# Patient Record
Sex: FEMALE | Race: WHITE | NOT HISPANIC OR LATINO | URBAN - METROPOLITAN AREA
[De-identification: names, ages, dates, MRNs, and addresses within clinical notes are randomized per-mention and may not be internally consistent; named-entity substitution may affect disease eponyms.]

---

## 2017-01-09 NOTE — PATIENT DISCUSSION
EXPLAINED THAT THE SX MAY HAVE DISRUPTED THE VITREOUS OR OTHER PART OF THE BACK OF THE EYE BUT REASSURED HER THAT THESE SYMPTOMS ARE NOT DUE TO A PROBLEM IN THE FRONT OF THE EYE.

## 2017-01-09 NOTE — PATIENT DISCUSSION
DISCOMFORT LIKELY RELATED TO SOME OCULAR SURFACE IRRITATION. REASSURED PT THAT USUALLY GETS BETTER OVER TIME. DISC OPT OF BCTL FOR INCREASED COMFORT. IF NO HELP W/ BCTL CONSIDER OINTMENT Q1 HOUR FOR ADDED LUBRICATION. PT DECLINES BCTL FOR NOW. RECOMMEND AGGRESSIVE LUBRICATION.

## 2017-01-09 NOTE — PATIENT DISCUSSION
NO OCULAR SIGNS OR SOURCES CAUSING FLASHING LIGHTS. PT HAS SYMPTOMS IN PURE DARKNESS AND WHEN HER EYES ARE CLOSED OR OPEN. RECOMMEND RETINAL EVAL. EXPLAINED THAT IF IT WAS RELATED TO THE IOL SHE WOULD NOT SEE THE FLASHING WHEN HER EYES ARE CLOSED.

## 2017-01-09 NOTE — PATIENT DISCUSSION
IF RETINAL WORK UP WNL CONSIDER NEURO CECILY LUNDBERG/ DR. Gwendalyn Hatchet.  PT REPORTS LAST CECILY LUNDBERG/ DR. Gwendalyn Hatchet WAS WNL BUT THAT WAS PRIOR TO CAT SX.

## 2017-01-09 NOTE — PATIENT DISCUSSION
Continue: Lotemax (loteprednol etabonate): gel: 0.5% 1 drop four times a day as directed into left eye 12-

## 2017-02-27 NOTE — PATIENT DISCUSSION
Dry Eye Syndrome (BEBETO) Counseling: Dry Eye Syndrome, also known as Keratoconjunctivitis Sicca, is a common condition that occurs when your tears are not able to provide adequate lubrication for your eyes. Symptoms can be exacerbated by environmental factors such as smoke, wind, or prolonged eye use. Treatment options include, but are not limited to, artificial tears, punctal plugs, Restasis, oral omega-3 supplements, and lubricating ointments. I have explained to the patient that succesful management is dependent on patient compliance with treatment as prescribed and/or the treatment regimen.

## 2017-02-27 NOTE — PATIENT DISCUSSION
RODRICKS ADVISED PT THAT SHE SHOULD CONT TO FOLLOW WITH DR. NUÑEZ DUE TO HER HAVING  PRIOR VISITS AND CURRENT TREATMENT THAT IS WORKING.

## 2017-02-27 NOTE — PATIENT DISCUSSION
PATIENT REPORTS LOSS OF DEPTH PERCEPTION TO HER LEFT SIDE, (EXAMPLE DROPPING THE MILK WHEN SHE REACHES FOR IT). KDS ADV PT THAT HER EYES WILL NOT EFFECT HER DEPTH PERCEPTION AND THAT SHE SHOULD FOLLOW WITH HER INTERNIST.

## 2017-10-27 NOTE — PATIENT DISCUSSION
According to patient history,she had RES in left eye that has resolved and was treated by DR. Nina Moreno.

## 2017-10-27 NOTE — PATIENT DISCUSSION
Patient has seen both Dr. Jeremy Lee and Dr. Yara Zimmerman for issues following her cataract surgery - extreme dry eye per patients comments

## 2018-05-23 NOTE — PATIENT DISCUSSION
MILD RECURRENT CORNEAL EROSION, OU: PRESCRIBED GEL DROPS AT BEDTIME. PT COMPLAINED OF BURNING WHEN USING XIIDRA. TOLD PT TO NOT RESTART XIIDRA AT THIS TIME. TRY GEL DROPS FIRST.

## 2018-05-23 NOTE — PATIENT DISCUSSION
POSTERIOR VITREOUS DETACHMENT, OU: NO RETINAL HOLES OR TEARS ON SLIT LAMP AND INDIRECT OPHTHALMOSCOPY. RD PRECAUTIONS DISCUSSED. CONTINUE FOLLOW UP WITH DR. Florian Rosa.

## 2018-05-23 NOTE — PATIENT DISCUSSION
MODERATE DRY EYES: PRESCRIBED PRESERVATIVE FREE ARTIFICIAL TEARS 4-6X A DAY, OU AND THE DAILY INTAKE OF OMEGA-3 DHA/EPA FATTY ACIDS TO HELP RELIEVE SYMPTOMS. ADD NIGHTLY LUBRICATING OINTMENT OR GEL. WILL CONSIDER RESTASIS OR PUNCTAL PLUGS AND/OR LIPIFLOW TREATMENT NEXT VISIT IF NOT RESPONSIVE OR IF SYMPTOMS PERSIST. RETURN FOR FOLLOW-UP AS SCHEDULED OR SOONER IF SYMPTOMS WORSEN.

## 2019-05-06 ENCOUNTER — IMPORTED ENCOUNTER (OUTPATIENT)
Dept: URBAN - METROPOLITAN AREA CLINIC 31 | Facility: CLINIC | Age: 49
End: 2019-05-06

## 2019-05-06 PROCEDURE — 92004 COMPRE OPH EXAM NEW PT 1/>: CPT

## 2019-05-06 NOTE — PATIENT DISCUSSION
1.  Refractive error - Change glasses. 2.  Return for an appointment in 1 year for comprehensive exam. with Dr. Connie Olivier.

## 2019-11-08 NOTE — PATIENT DISCUSSION
DRY EYE SYNDROME, OU: GOOD RESULTS WITH USE OF RESTASIS AND ARTIFICIAL TEARS BID. CONTINUE AS PRESCRIBED BY DR. Rochelle Bhatti. RETURN FOR FOLLOW-UP AS SCHEDULED OR SOONER IF SYMPTOMS WORSEN.

## 2019-11-08 NOTE — PATIENT DISCUSSION
CHOROIDAL NEVUS, OS: APPEARS STABLE AND BENIGN. PATIENT HAS HISTORY OF MELANOMA OF SKIN (2019). REFER TO RETINA FOR BASELINE EXAM.  PRESCRIBED UV PROTECTION TO MINIMIZE UV EXPOSURE.

## 2019-11-08 NOTE — PATIENT DISCUSSION
REFRACTIVE ERROR: NEW SPEC RX PROVIDED. RX HAS CHANGED FROM HER LAST EXAM. DISCUSSED LIKELIHOOD THAT DRY EYE COULD CONTRIBUTE TO RX SHIFT, BUT NO OTHER OCULAR FINDINGS SUGGEST PATHOLOGICAL REASON FOR RX CHANGE. ALSO RECOMMEND LOWER SEG HEIGHT PER PATIENT REQUEST.

## 2019-12-11 NOTE — PATIENT DISCUSSION
CHOROIDAL NEVUS, OS: STABLE. PRESCRIBED UV PROTECTION TO MINIMIZE UV EXPOSURE. RETURN AS SCHEDULED FOR EVALUATION AND PHOTO DOCUMENTATION WITH DR FALL.

## 2019-12-11 NOTE — PATIENT DISCUSSION
RETURN TO DR FALL FOR YAG EVAL OS, AND ADVANCED MANAGEMENT OF DRY EYE SYNDROME AND ALLERGIC CONJUNCTIVITIS.

## 2019-12-11 NOTE — PATIENT DISCUSSION
RETINA IS ATTACHED OU: PVD OU; MYELINATED RNFL STABLE OD; NO HOLES OR TEARS SEEN ON DILATED EXAM TODAY.  RETINAL DETACHMENT SIGNS AND SYMPTOMS REVIEWED

## 2020-02-21 NOTE — PATIENT DISCUSSION
K SICCA OU: NO IMPROVEMENT WITH PRESERVATIVE FREE ARTIFICIAL TEARS 4-6X A DAY, OU  ADD NIGHTLY LUBRICATING OINTMENT OR GEL. CONTINUE RESTASIS BID OU. CONSIDER PUNCTAL PLUGS AND/OR LIPIFLOW TREATMENT NEXT VISIT IF NOT RESPONSIVE OR IF SYMPTOMS PERSIST. RETURN FOR FOLLOW-UP AS SCHEDULED OR SOONER IF SYMPTOMS WORSEN.

## 2020-05-26 NOTE — PATIENT DISCUSSION
POST OP YAG OS FOR PCO. PATIENT DOING WELL WITHOUT ANY SIGNS OF RETINAL HOLES OR TEARS. NEW SPEC RX GIVEN.

## 2020-08-07 ENCOUNTER — IMPORTED ENCOUNTER (OUTPATIENT)
Dept: URBAN - METROPOLITAN AREA CLINIC 31 | Facility: CLINIC | Age: 50
End: 2020-08-07

## 2020-08-07 PROCEDURE — 92014 COMPRE OPH EXAM EST PT 1/>: CPT

## 2020-08-07 NOTE — PATIENT DISCUSSION
1.  Refractive error - Glasses change optional. 2.  Return for an appointment in 1 year for comprehensive exam. with Dr. Noemy Koehler

## 2020-10-16 NOTE — PATIENT DISCUSSION
[address] RESTASIS BID OU, PT FEELS NOT IMPROVING WITH CURRENT TX. STABLE MRX, DISCUSSED CYL REFRACTION.  REFER TO [facility]

## 2020-10-16 NOTE — PATIENT DISCUSSION
Continue: Restasis (cyclosporine): dropperette: 0.05% 1 drop twice a day as directed into both eyes 09-

## 2021-03-01 ENCOUNTER — OFFICE VISIT (OUTPATIENT)
Age: 51
End: 2021-03-01

## 2021-04-01 ENCOUNTER — OFFICE VISIT (OUTPATIENT)
Age: 51
End: 2021-04-01

## 2021-07-19 ENCOUNTER — IMPORTED ENCOUNTER (OUTPATIENT)
Dept: URBAN - METROPOLITAN AREA CLINIC 31 | Facility: CLINIC | Age: 51
End: 2021-07-19

## 2021-07-19 PROCEDURE — 92015 DETERMINE REFRACTIVE STATE: CPT

## 2021-07-19 PROCEDURE — 92014 COMPRE OPH EXAM EST PT 1/>: CPT

## 2021-08-12 ENCOUNTER — IMPORTED ENCOUNTER (OUTPATIENT)
Dept: URBAN - METROPOLITAN AREA CLINIC 31 | Facility: CLINIC | Age: 51
End: 2021-08-12

## 2021-08-12 PROCEDURE — 92014 COMPRE OPH EXAM EST PT 1/>: CPT

## 2021-08-12 NOTE — PATIENT DISCUSSION
1.  Refractive error - Glasses change optional. 2.  Return for an appointment in 1 year for comprehensive exam. with Dr. Skyler Adkins.

## 2021-09-15 NOTE — PATIENT DISCUSSION
Continue: Restasis (cyclosporine): dropperette: 0.05% 1 a small amount twice a day into both eyes 09-

## 2021-09-15 NOTE — PATIENT DISCUSSION
DRY EYE SYNDROME: RECOMMEND ARTIFICIAL TEARS TID-QID OU. RETURN FOR FOLLOW-UP AS SCHEDULED OR SOONER IF SYMPTOMS WORSEN.

## 2021-11-01 ENCOUNTER — OFFICE VISIT (OUTPATIENT)
Age: 51
End: 2021-11-01

## 2022-02-28 ENCOUNTER — OFFICE VISIT (OUTPATIENT)
Dept: URBAN - METROPOLITAN AREA CLINIC 9 | Facility: CLINIC | Age: 52
End: 2022-02-28

## 2022-03-17 ENCOUNTER — OFFICE VISIT (OUTPATIENT)
Dept: URBAN - METROPOLITAN AREA SURGERY CENTER 9 | Facility: SURGERY CENTER | Age: 52
End: 2022-03-17

## 2022-03-29 ENCOUNTER — TELEPHONE ENCOUNTER (OUTPATIENT)
Dept: URBAN - METROPOLITAN AREA CLINIC 9 | Facility: CLINIC | Age: 52
End: 2022-03-29

## 2022-04-01 ASSESSMENT — VISUAL ACUITY
OU_SC: 20/4016''
OU_SC: J2
OS_CC: 20/30+2
OD_CC: 20/25-1
OD_CC: 20/20-2
OD_SC: 20/3016''
OD_CC: 20/20
OU_CC: 20/20
OU_CC: 20/2016''
OS_CC: 20/40-2
OS_CC: 20/30
OS_SC: 20/4016''
OD_SC: 20/3016''
OS_SC: 20/4016''

## 2022-04-01 ASSESSMENT — TONOMETRY
OD_IOP_MMHG: 13
OD_IOP_MMHG: 13
OS_IOP_MMHG: 14
OS_IOP_MMHG: 12
OS_IOP_MMHG: 13
OD_IOP_MMHG: 13

## 2022-04-08 ENCOUNTER — OFFICE VISIT (OUTPATIENT)
Dept: URBAN - METROPOLITAN AREA SURGERY CENTER 9 | Facility: SURGERY CENTER | Age: 52
End: 2022-04-08

## 2022-04-15 ENCOUNTER — OFFICE VISIT (OUTPATIENT)
Dept: URBAN - METROPOLITAN AREA SURGERY CENTER 9 | Facility: SURGERY CENTER | Age: 52
End: 2022-04-15

## 2022-05-19 ENCOUNTER — OFFICE VISIT (OUTPATIENT)
Dept: URBAN - METROPOLITAN AREA CLINIC 9 | Facility: CLINIC | Age: 52
End: 2022-05-19

## 2022-07-30 ENCOUNTER — TELEPHONE ENCOUNTER (OUTPATIENT)
Age: 52
End: 2022-07-30

## 2022-07-31 ENCOUNTER — TELEPHONE ENCOUNTER (OUTPATIENT)
Age: 52
End: 2022-07-31

## 2022-07-31 RX ORDER — FAMOTIDINE 40 MG/1
1 (ONE) TABLET, FILM COATED ORAL BID
Qty: 0 | Refills: 5 | Status: ACTIVE | COMMUNITY
Start: 2022-05-19

## 2022-08-15 ENCOUNTER — ESTABLISHED PATIENT (OUTPATIENT)
Dept: URBAN - METROPOLITAN AREA CLINIC 29 | Facility: CLINIC | Age: 52
End: 2022-08-15

## 2022-08-15 DIAGNOSIS — H52.13: ICD-10-CM

## 2022-08-15 DIAGNOSIS — H52.222: ICD-10-CM

## 2022-08-15 DIAGNOSIS — H52.4: ICD-10-CM

## 2022-08-15 PROCEDURE — 92015 DETERMINE REFRACTIVE STATE: CPT

## 2022-08-15 PROCEDURE — 92014 COMPRE OPH EXAM EST PT 1/>: CPT

## 2022-08-15 ASSESSMENT — TONOMETRY
OD_IOP_MMHG: 12
OS_IOP_MMHG: 12

## 2022-08-15 ASSESSMENT — VISUAL ACUITY
OS_SC: J5
OD_SC: 20/20
OD_SC: J5
OS_SC: 20/20-2

## 2022-08-15 NOTE — PATIENT DISCUSSION
Recommend progressives. Had problems adapting in past but did not give them much time for adaptation.

## 2022-10-24 NOTE — PATIENT DISCUSSION
Educated patient on findings. Continue Restasis BID OU and begin good quality artificial tears prn OU. Advised to call/RTC if si/sx persist or worsen. Recommend patient see/follow-up with cornea specialist as originally recommended by Dr. Ann Harper. Monitor.

## 2022-11-15 NOTE — PATIENT DISCUSSION
Educated patient on findings. Continue Restasis BID OU and begin good quality artificial tears prn OU. Advised to call/RTC if si/sx persist or worsen. Recommend patient see/follow-up with cornea specialist as originally recommended by Dr. Kayden Aldana. Monitor.

## 2022-12-19 ENCOUNTER — OFFICE VISIT (OUTPATIENT)
Dept: URBAN - METROPOLITAN AREA CLINIC 9 | Facility: CLINIC | Age: 52
End: 2022-12-19
Payer: COMMERCIAL

## 2022-12-19 ENCOUNTER — WEB ENCOUNTER (OUTPATIENT)
Dept: URBAN - METROPOLITAN AREA CLINIC 9 | Facility: CLINIC | Age: 52
End: 2022-12-19

## 2022-12-19 VITALS
BODY MASS INDEX: 26.98 KG/M2 | HEIGHT: 64 IN | SYSTOLIC BLOOD PRESSURE: 138 MMHG | WEIGHT: 158 LBS | DIASTOLIC BLOOD PRESSURE: 80 MMHG

## 2022-12-19 DIAGNOSIS — R19.7 DIARRHEA: ICD-10-CM

## 2022-12-19 DIAGNOSIS — R14.0 BLOATING: ICD-10-CM

## 2022-12-19 DIAGNOSIS — R10.9 ABDOMINAL PAIN: ICD-10-CM

## 2022-12-19 DIAGNOSIS — K52.9 CHRONIC DIARRHEA: ICD-10-CM

## 2022-12-19 PROCEDURE — 99214 OFFICE O/P EST MOD 30 MIN: CPT | Performed by: INTERNAL MEDICINE

## 2022-12-19 RX ORDER — FAMOTIDINE 40 MG/1
1 (ONE) TABLET, FILM COATED ORAL BID
Qty: 0 | Refills: 5 | Status: ACTIVE | COMMUNITY
Start: 2022-05-19

## 2022-12-19 RX ORDER — CHOLESTYRAMINE 4 G/9G
1 PACKET MIXED WITH WATER OR NON-CARBONATED DRINK POWDER, FOR SUSPENSION ORAL ONCE A DAY
Status: ACTIVE | COMMUNITY

## 2022-12-19 RX ORDER — VALSARTAN 80 MG/1
1 TABLET TABLET, FILM COATED ORAL ONCE A DAY
Status: ACTIVE | COMMUNITY

## 2022-12-19 RX ORDER — SODIUM, POTASSIUM,MAG SULFATES 17.5-3.13G
177ML SOLUTION, RECONSTITUTED, ORAL ORAL ONCE
Qty: 1 | Refills: 0 | OUTPATIENT

## 2022-12-19 RX ORDER — ROSUVASTATIN CALCIUM 5 MG/1
1 TABLET TABLET, FILM COATED ORAL ONCE A DAY
Status: ACTIVE | COMMUNITY

## 2022-12-19 NOTE — HPI-TODAY'S VISIT:
52 year old female here for f/u. We last saw her about 7 months ago.  At prior visit: 51-year-old female comes in for abdominal pain bloating and diarrhea. We have labs from February 2022. LFTs are normal. Right upper quadrant ultrasound done on January 28 of 2022 showed normal ultrasound  She says her symptoms began about 6 months ago. She started getting midepigastric pain. This pain which happens daily after she eats and can last for hours and all day. She was put on both Pepcid and Prilosec 2 weeks and this helped. But if she misses a Pepcid then the symptoms comes right back. She also has acid reflux it was also helped with these meds. This is a new symptom. She is never had this before. She gained about 10-15 pounds over the last 1 year. She suffered from intermittent chronic diarrhea for years. Had a colonoscopy to evaluate this in 2015 which was normal. This being on the Pepcid nightly and also the Prilosec which she took for 2 weeks of diarrhea is actually resolved. Now she is more constipated and sometimes will skip up to 4 days before having a bowel movement. Mainly she skips a day or 2. Then will have loose stool Denies hematochezia. Denies lower abdominal pain. No family history of GI malignancies. Denies chronic NSAID use. She also is complaining about bloating  At last visit we proceeded with an EGD which was normal except for concern for short segmen Benavidez's but biopsies did not show this.  There also appeared to be a pancreatic rest.  She then had an EUS which supported this.  We put on Benefiber twice a day for chronic diarrhea and low fat maps diet  She is here for follow-up today.  Her symptoms actually totally went away from mid epigastric pain perspective when she started the Protonix.  Check she ran out a little bit ago and she is had a come back but intermittently and not like before just very intermittently.  We went over EGD EUS findings.  Her diarrhea has improved on the Benefiber At last visit, she was doing great on benefiber and pepcdi  EGD and colon were in 3/2022. 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 gastric bxs were normal Patient is referred for diarrhea.  She said she was doing great when all of a sudden all of her symptoms returned.  The midepigastric discomfort came back as did the diarrhea.  The diarrhea is intermittent but she tends to get the midepigastric discomfort when she gets the diarrhea.  Some days she will have 1-2 loose stools a day and some days she will have any loose stools.  No hematochezia weight loss.  No new medications since this worsened.  She did restart the Protonix recently as this did help in the past.  She is also having a lot of bloating.  She was just started on Questran but just a few days ago

## 2022-12-30 ENCOUNTER — TELEPHONE ENCOUNTER (OUTPATIENT)
Dept: URBAN - METROPOLITAN AREA CLINIC 7 | Facility: CLINIC | Age: 52
End: 2022-12-30

## 2023-01-04 NOTE — PATIENT DISCUSSION
Educated patient on findings. Continue Restasis BID OU and begin good quality artificial tears prn OU. Advised to call/RTC if si/sx persist or worsen. Recommend patient see/follow-up with cornea specialist as originally recommended by Dr. Michelle Adkins. Monitor.

## 2023-01-05 ENCOUNTER — CLAIMS CREATED FROM THE CLAIM WINDOW (OUTPATIENT)
Dept: URBAN - METROPOLITAN AREA CLINIC 4 | Facility: CLINIC | Age: 53
End: 2023-01-05
Payer: COMMERCIAL

## 2023-01-05 ENCOUNTER — CLAIMS CREATED FROM THE CLAIM WINDOW (OUTPATIENT)
Dept: URBAN - METROPOLITAN AREA SURGERY CENTER 9 | Facility: SURGERY CENTER | Age: 53
End: 2023-01-05
Payer: COMMERCIAL

## 2023-01-05 DIAGNOSIS — R19.7 ACUTE DIARRHEA: ICD-10-CM

## 2023-01-05 DIAGNOSIS — K63.5 BENIGN COLON POLYP: ICD-10-CM

## 2023-01-05 DIAGNOSIS — K64.8 BLEEDING INTERNAL HEMORRHOIDS: ICD-10-CM

## 2023-01-05 DIAGNOSIS — K63.89 OTHER SPECIFIED DISEASES OF INTESTINE: ICD-10-CM

## 2023-01-05 DIAGNOSIS — K57.30 ACQUIRED DIVERTICULOSIS OF COLON: ICD-10-CM

## 2023-01-05 PROCEDURE — 45380 COLONOSCOPY AND BIOPSY: CPT | Performed by: INTERNAL MEDICINE

## 2023-01-05 PROCEDURE — 45385 COLONOSCOPY W/LESION REMOVAL: CPT | Performed by: INTERNAL MEDICINE

## 2023-01-05 PROCEDURE — 88305 TISSUE EXAM BY PATHOLOGIST: CPT | Performed by: PATHOLOGY

## 2023-01-05 RX ORDER — VALSARTAN 80 MG/1
1 TABLET TABLET, FILM COATED ORAL ONCE A DAY
Status: ACTIVE | COMMUNITY

## 2023-01-05 RX ORDER — SODIUM, POTASSIUM,MAG SULFATES 17.5-3.13G
177ML SOLUTION, RECONSTITUTED, ORAL ORAL ONCE
Qty: 1 | Refills: 0 | Status: ACTIVE | COMMUNITY

## 2023-01-05 RX ORDER — FAMOTIDINE 40 MG/1
1 (ONE) TABLET, FILM COATED ORAL BID
Qty: 0 | Refills: 5 | Status: ACTIVE | COMMUNITY
Start: 2022-05-19

## 2023-01-05 RX ORDER — ROSUVASTATIN CALCIUM 5 MG/1
1 TABLET TABLET, FILM COATED ORAL ONCE A DAY
Status: ACTIVE | COMMUNITY

## 2023-01-05 RX ORDER — CHOLESTYRAMINE 4 G/9G
1 PACKET MIXED WITH WATER OR NON-CARBONATED DRINK POWDER, FOR SUSPENSION ORAL ONCE A DAY
Status: ACTIVE | COMMUNITY

## 2023-01-17 NOTE — PATIENT DISCUSSION
Educated patient on findings. Continue Restasis BID OU and begin good quality artificial tears prn OU. Advised to call/RTC if si/sx persist or worsen. Recommend patient see/follow-up with cornea specialist as originally recommended by Dr. Eileen Yepez. Monitor.

## 2023-01-17 NOTE — PATIENT DISCUSSION
"Educated patient on findings. 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 Begin warm compresses BID-TID and prescribe judith-prabhu-poly glory qhs/prn OD. Due to recurrent nature and previous appearance of ""ulcerated lesion"" recommend keeping scheduled appt with Dr. Amira Grady for excisional biopsy. Monitor. "

## 2023-01-17 NOTE — PATIENT DISCUSSION
Discussed that if no visual improvement with refraction then may need to continue to address ocular surface disease first.

## 2023-01-21 LAB
IMMUNOGLOBULIN A: 167
INTERPRETATION: (no result)
TISSUE TRANSGLUTAMINASE AB, IGA: <1

## 2023-02-15 ENCOUNTER — OFFICE VISIT (OUTPATIENT)
Dept: URBAN - METROPOLITAN AREA CLINIC 9 | Facility: CLINIC | Age: 53
End: 2023-02-15
Payer: COMMERCIAL

## 2023-02-15 ENCOUNTER — DASHBOARD ENCOUNTERS (OUTPATIENT)
Age: 53
End: 2023-02-15

## 2023-02-15 VITALS
HEIGHT: 64 IN | WEIGHT: 160 LBS | SYSTOLIC BLOOD PRESSURE: 118 MMHG | DIASTOLIC BLOOD PRESSURE: 78 MMHG | BODY MASS INDEX: 27.31 KG/M2

## 2023-02-15 DIAGNOSIS — R14.0 BLOATING: ICD-10-CM

## 2023-02-15 DIAGNOSIS — D12.6 COLON ADENOMA: ICD-10-CM

## 2023-02-15 DIAGNOSIS — K58.0 IBS-D: ICD-10-CM

## 2023-02-15 DIAGNOSIS — R19.7 DIARRHEA: ICD-10-CM

## 2023-02-15 DIAGNOSIS — R10.9 ABDOMINAL PAIN: ICD-10-CM

## 2023-02-15 DIAGNOSIS — K52.9 CHRONIC DIARRHEA: ICD-10-CM

## 2023-02-15 PROBLEM — 10743008 IRRITABLE BOWEL SYNDROME: Status: ACTIVE | Noted: 2023-02-15

## 2023-02-15 PROCEDURE — 99214 OFFICE O/P EST MOD 30 MIN: CPT | Performed by: INTERNAL MEDICINE

## 2023-02-15 RX ORDER — CHOLESTYRAMINE 4 G/9G
1 PACKET MIXED WITH WATER OR NON-CARBONATED DRINK POWDER, FOR SUSPENSION ORAL ONCE A DAY
Status: DISCONTINUED | COMMUNITY

## 2023-02-15 RX ORDER — SODIUM, POTASSIUM,MAG SULFATES 17.5-3.13G
177ML SOLUTION, RECONSTITUTED, ORAL ORAL ONCE
Qty: 1 | Refills: 0 | Status: DISCONTINUED | COMMUNITY

## 2023-02-15 RX ORDER — BUPROPION HYDROCHLORIDE 150 MG/1
TABLET, EXTENDED RELEASE ORAL
Qty: 90 TABLET | Status: ACTIVE | COMMUNITY

## 2023-02-15 RX ORDER — FAMOTIDINE 40 MG/1
1 (ONE) TABLET, FILM COATED ORAL BID
Qty: 0 | Refills: 5 | Status: ACTIVE | COMMUNITY
Start: 2022-05-19

## 2023-02-15 RX ORDER — ROSUVASTATIN CALCIUM 5 MG/1
1 TABLET TABLET, FILM COATED ORAL ONCE A DAY
Status: ACTIVE | COMMUNITY

## 2023-02-15 RX ORDER — VALSARTAN 80 MG/1
1 TABLET TABLET, FILM COATED ORAL ONCE A DAY
Status: DISCONTINUED | COMMUNITY

## 2023-02-15 NOTE — HPI-TODAY'S VISIT:
52 year old female here for f/u. We last saw her about 7 months ago.  At prior visit: 51-year-old female comes in for abdominal pain bloating and diarrhea. We have labs from February 2022. LFTs are normal. Right upper quadrant ultrasound done on January 28 of 2022 showed normal ultrasound  She says her symptoms began about 6 months ago. She started getting midepigastric pain. This pain which happens daily after she eats and can last for hours and all day. She was put on both Pepcid and Prilosec 2 weeks and this helped. But if she misses a Pepcid then the symptoms comes right back. She also has acid reflux it was also helped with these meds. This is a new symptom. She is never had this before. She gained about 10-15 pounds over the last 1 year. She suffered from intermittent chronic diarrhea for years. Had a colonoscopy to evaluate this in 2015 which was normal. This being on the Pepcid nightly and also the Prilosec which she took for 2 weeks of diarrhea is actually resolved. Now she is more constipated and sometimes will skip up to 4 days before having a bowel movement. Mainly she skips a day or 2. Then will have loose stool Denies hematochezia. Denies lower abdominal pain. No family history of GI malignancies. Denies chronic NSAID use. She also is complaining about bloating  At last visit we proceeded with an EGD which was normal except for concern for short segmen Benavidez's but biopsies did not show this.  There also appeared to be a pancreatic rest.  She then had an EUS which supported this.  We put on Benefiber twice a day for chronic diarrhea and low fat maps diet  She is here for follow-up today.  Her symptoms actually totally went away from mid epigastric pain perspective when she started the Protonix.  Check she ran out a little bit ago and she is had a come back but intermittently and not like before just very intermittently.  We went over EGD EUS findings.  Her diarrhea has improved on the Benefiber At last visit, she was doing great on benefiber and pepcdi  EGD and colon were in 3/2022. 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 gastric bxs were normal Patient is referred for diarrhea.  She said she was doing great when all of a sudden all of her symptoms returned.  The midepigastric discomfort came back as did the diarrhea.  The diarrhea is intermittent but she tends to get the midepigastric discomfort when she gets the diarrhea.  Some days she will have 1-2 loose stools a day and some days she will have any loose stools.  No hematochezia weight loss.  No new medications since this worsened.  She did restart the Protonix recently as this did help in the past.  She is also having a lot of bloating.  She was just started on Questran but just a few days ago  We proceeded with colonscopy with col bxs and celiac testing and low fomdaps diet and benefiber bid  Random bxs were normal, two small polyps, one benign and one ta and neg celiac test  She is here for follow-up today.  She has been doing Benefiber once a day.  Her symptoms have actually gotten better where she is having a bowel movement 1 to 2 days but not once every 2 weeks she will have an episode of diarrhea.  Still having bloating

## 2023-08-23 ENCOUNTER — APPOINTMENT (RX ONLY)
Dept: URBAN - METROPOLITAN AREA CLINIC 334 | Facility: CLINIC | Age: 53
Setting detail: DERMATOLOGY
End: 2023-08-23

## 2023-08-23 DIAGNOSIS — L82.0 INFLAMED SEBORRHEIC KERATOSIS: ICD-10-CM

## 2023-08-23 PROCEDURE — ? COUNSELING

## 2023-08-23 PROCEDURE — 99203 OFFICE O/P NEW LOW 30 MIN: CPT

## 2023-08-23 PROCEDURE — ? DEFER

## 2023-08-23 ASSESSMENT — LOCATION SIMPLE DESCRIPTION DERM
LOCATION SIMPLE: RIGHT CHEEK
LOCATION SIMPLE: RIGHT CHEEK

## 2023-08-23 ASSESSMENT — LOCATION DETAILED DESCRIPTION DERM
LOCATION DETAILED: RIGHT MEDIAL MALAR CHEEK
LOCATION DETAILED: RIGHT MEDIAL MALAR CHEEK

## 2023-08-23 ASSESSMENT — LOCATION ZONE DERM
LOCATION ZONE: FACE
LOCATION ZONE: FACE

## 2023-09-12 ENCOUNTER — APPOINTMENT (RX ONLY)
Dept: URBAN - METROPOLITAN AREA CLINIC 334 | Facility: CLINIC | Age: 53
Setting detail: DERMATOLOGY
End: 2023-09-12

## 2023-09-12 DIAGNOSIS — Z87.2 PERSONAL HISTORY OF DISEASES OF THE SKIN AND SUBCUTANEOUS TISSUE: ICD-10-CM

## 2023-09-12 DIAGNOSIS — L82.1 OTHER SEBORRHEIC KERATOSIS: ICD-10-CM

## 2023-09-12 DIAGNOSIS — L82.0 INFLAMED SEBORRHEIC KERATOSIS: ICD-10-CM

## 2023-09-12 DIAGNOSIS — L81.4 OTHER MELANIN HYPERPIGMENTATION: ICD-10-CM

## 2023-09-12 PROCEDURE — 99213 OFFICE O/P EST LOW 20 MIN: CPT | Mod: 25

## 2023-09-12 PROCEDURE — 17110 DESTRUCTION B9 LES UP TO 14: CPT

## 2023-09-12 PROCEDURE — ? TREATMENT REGIMEN

## 2023-09-12 PROCEDURE — ? LIQUID NITROGEN

## 2023-09-12 PROCEDURE — ? COUNSELING

## 2023-09-12 ASSESSMENT — LOCATION ZONE DERM
LOCATION ZONE: TRUNK
LOCATION ZONE: FACE
LOCATION ZONE: FACE

## 2023-09-12 ASSESSMENT — LOCATION DETAILED DESCRIPTION DERM
LOCATION DETAILED: RIGHT MEDIAL MALAR CHEEK
LOCATION DETAILED: RIGHT MEDIAL MALAR CHEEK
LOCATION DETAILED: INFERIOR THORACIC SPINE

## 2023-09-12 ASSESSMENT — LOCATION SIMPLE DESCRIPTION DERM
LOCATION SIMPLE: RIGHT CHEEK
LOCATION SIMPLE: UPPER BACK
LOCATION SIMPLE: RIGHT CHEEK

## 2023-09-12 NOTE — PROCEDURE: LIQUID NITROGEN
Spray Paint Technique: No
Medical Necessity Information: It is in your best interest to select a reason for this procedure from the list below. All of these items fulfill various CMS LCD requirements except the new and changing color options.
Show Aperture Variable?: Yes
Medical Necessity Clause: This procedure was medically necessary because the lesions that were treated were:
Post-Care Instructions: I reviewed with the patient in detail post-care instructions. Patient is to wear sunprotection, and avoid picking at any of the treated lesions. Pt may apply Vaseline to crusted or scabbing areas.
Detail Level: Detailed
Spray Paint Text: The liquid nitrogen was applied to the skin utilizing a spray paint frosting technique.
Consent: The patient's consent was obtained including but not limited to risks of crusting, scabbing, blistering, scarring, darker or lighter pigmentary change, recurrence, incomplete removal and infection.

## 2023-10-10 ENCOUNTER — COMPREHENSIVE EXAM (OUTPATIENT)
Dept: URBAN - METROPOLITAN AREA CLINIC 29 | Facility: CLINIC | Age: 53
End: 2023-10-10

## 2023-10-10 DIAGNOSIS — H52.4: ICD-10-CM

## 2023-10-10 DIAGNOSIS — H52.13: ICD-10-CM

## 2023-10-10 DIAGNOSIS — H52.222: ICD-10-CM

## 2023-10-10 PROCEDURE — 92014 COMPRE OPH EXAM EST PT 1/>: CPT

## 2023-10-10 PROCEDURE — 92015 DETERMINE REFRACTIVE STATE: CPT

## 2023-10-10 ASSESSMENT — VISUAL ACUITY
OS_PH: 20/30
OS_CC: 20/40
OS_SC: 20/25-2
OD_CC: 20/25
OD_SC: 20/25

## 2023-10-10 ASSESSMENT — TONOMETRY
OS_IOP_MMHG: 12
OD_IOP_MMHG: 14

## 2024-03-26 ENCOUNTER — APPOINTMENT (RX ONLY)
Dept: URBAN - METROPOLITAN AREA CLINIC 334 | Facility: CLINIC | Age: 54
Setting detail: DERMATOLOGY
End: 2024-03-26

## 2024-03-26 DIAGNOSIS — D18.0 HEMANGIOMA: ICD-10-CM

## 2024-03-26 DIAGNOSIS — L82.1 OTHER SEBORRHEIC KERATOSIS: ICD-10-CM

## 2024-03-26 DIAGNOSIS — L81.4 OTHER MELANIN HYPERPIGMENTATION: ICD-10-CM

## 2024-03-26 DIAGNOSIS — D22 MELANOCYTIC NEVI: ICD-10-CM

## 2024-03-26 DIAGNOSIS — Z12.83 ENCOUNTER FOR SCREENING FOR MALIGNANT NEOPLASM OF SKIN: ICD-10-CM

## 2024-03-26 PROBLEM — D18.01 HEMANGIOMA OF SKIN AND SUBCUTANEOUS TISSUE: Status: ACTIVE | Noted: 2024-03-26

## 2024-03-26 PROBLEM — D48.5 NEOPLASM OF UNCERTAIN BEHAVIOR OF SKIN: Status: ACTIVE | Noted: 2024-03-26

## 2024-03-26 PROCEDURE — 11104 PUNCH BX SKIN SINGLE LESION: CPT

## 2024-03-26 PROCEDURE — 99213 OFFICE O/P EST LOW 20 MIN: CPT | Mod: 25

## 2024-03-26 PROCEDURE — ? BIOPSY BY PUNCH METHOD

## 2024-03-26 PROCEDURE — 11105 PUNCH BX SKIN EA SEP/ADDL: CPT

## 2024-03-26 PROCEDURE — ? TREATMENT REGIMEN

## 2024-03-26 PROCEDURE — ? FULL BODY SKIN EXAM

## 2024-03-26 PROCEDURE — ? COUNSELING

## 2024-03-26 ASSESSMENT — LOCATION ZONE DERM: LOCATION ZONE: TRUNK

## 2024-03-26 ASSESSMENT — LOCATION DETAILED DESCRIPTION DERM
LOCATION DETAILED: RIGHT INFERIOR MEDIAL UPPER BACK
LOCATION DETAILED: PERIUMBILICAL SKIN
LOCATION DETAILED: LEFT BUTTOCK
LOCATION DETAILED: UPPER STERNUM

## 2024-03-26 ASSESSMENT — LOCATION SIMPLE DESCRIPTION DERM
LOCATION SIMPLE: LEFT BUTTOCK
LOCATION SIMPLE: CHEST
LOCATION SIMPLE: ABDOMEN
LOCATION SIMPLE: RIGHT UPPER BACK

## 2024-04-11 ENCOUNTER — APPOINTMENT (RX ONLY)
Dept: URBAN - METROPOLITAN AREA CLINIC 334 | Facility: CLINIC | Age: 54
Setting detail: DERMATOLOGY
End: 2024-04-11

## 2024-04-11 DIAGNOSIS — L57.0 ACTINIC KERATOSIS: ICD-10-CM

## 2024-04-11 PROBLEM — C44.519 BASAL CELL CARCINOMA OF SKIN OF OTHER PART OF TRUNK: Status: ACTIVE | Noted: 2024-04-11

## 2024-04-11 PROCEDURE — ? CURETTAGE AND DESTRUCTION

## 2024-04-11 PROCEDURE — ? LIQUID NITROGEN

## 2024-04-11 PROCEDURE — ? COUNSELING

## 2024-04-11 PROCEDURE — 17000 DESTRUCT PREMALG LESION: CPT | Mod: 59

## 2024-04-11 PROCEDURE — 17262 DSTRJ MAL LES T/A/L 1.1-2.0: CPT

## 2024-04-11 PROCEDURE — ? PATHOLOGY DISCUSSION

## 2024-04-11 ASSESSMENT — LOCATION ZONE DERM: LOCATION ZONE: NOSE

## 2024-04-11 ASSESSMENT — LOCATION DETAILED DESCRIPTION DERM: LOCATION DETAILED: NASAL SUPRATIP

## 2024-04-11 ASSESSMENT — LOCATION SIMPLE DESCRIPTION DERM: LOCATION SIMPLE: NOSE

## 2024-04-11 NOTE — PROCEDURE: LIQUID NITROGEN
Show Aperture Variable?: Yes
Render Post-Care Instructions In Note?: no
Duration Of Freeze Thaw-Cycle (Seconds): 5
Number Of Freeze-Thaw Cycles: 1 freeze-thaw cycle
Post-Care Instructions: I reviewed with the patient in detail post-care instructions. Patient is to wear sunprotection, and avoid picking at any of the treated lesions. Pt may apply Vaseline to crusted or scabbing areas.
Application Tool (Optional): Liquid Nitrogen Sprayer
Detail Level: Detailed
Consent: The patient's consent was obtained including but not limited to risks of crusting, scabbing, blistering, scarring, darker or lighter pigmentary change, recurrence, incomplete removal and infection.

## 2024-09-03 ENCOUNTER — APPOINTMENT (RX ONLY)
Dept: URBAN - METROPOLITAN AREA CLINIC 334 | Facility: CLINIC | Age: 54
Setting detail: DERMATOLOGY
End: 2024-09-03

## 2024-09-03 DIAGNOSIS — Z85.828 PERSONAL HISTORY OF OTHER MALIGNANT NEOPLASM OF SKIN: ICD-10-CM

## 2024-09-03 DIAGNOSIS — L82.1 OTHER SEBORRHEIC KERATOSIS: ICD-10-CM

## 2024-09-03 DIAGNOSIS — L81.4 OTHER MELANIN HYPERPIGMENTATION: ICD-10-CM

## 2024-09-03 DIAGNOSIS — D18.0 HEMANGIOMA: ICD-10-CM

## 2024-09-03 DIAGNOSIS — Z12.83 ENCOUNTER FOR SCREENING FOR MALIGNANT NEOPLASM OF SKIN: ICD-10-CM

## 2024-09-03 PROBLEM — D18.01 HEMANGIOMA OF SKIN AND SUBCUTANEOUS TISSUE: Status: ACTIVE | Noted: 2024-09-03

## 2024-09-03 PROBLEM — D48.5 NEOPLASM OF UNCERTAIN BEHAVIOR OF SKIN: Status: ACTIVE | Noted: 2024-09-03

## 2024-09-03 PROCEDURE — ? BIOPSY BY PUNCH METHOD

## 2024-09-03 PROCEDURE — ? FULL BODY SKIN EXAM

## 2024-09-03 PROCEDURE — ? COUNSELING

## 2024-09-03 PROCEDURE — ? TREATMENT REGIMEN

## 2024-09-03 PROCEDURE — 99213 OFFICE O/P EST LOW 20 MIN: CPT | Mod: 25

## 2024-09-03 PROCEDURE — 11104 PUNCH BX SKIN SINGLE LESION: CPT

## 2024-09-03 ASSESSMENT — LOCATION SIMPLE DESCRIPTION DERM
LOCATION SIMPLE: RIGHT UPPER BACK
LOCATION SIMPLE: ABDOMEN
LOCATION SIMPLE: CHEST

## 2024-09-03 ASSESSMENT — LOCATION ZONE DERM: LOCATION ZONE: TRUNK

## 2024-09-03 ASSESSMENT — LOCATION DETAILED DESCRIPTION DERM
LOCATION DETAILED: PERIUMBILICAL SKIN
LOCATION DETAILED: UPPER STERNUM
LOCATION DETAILED: RIGHT INFERIOR MEDIAL UPPER BACK

## 2024-09-27 NOTE — PROCEDURE: CURETTAGE AND DESTRUCTION
Detail Level: Detailed
Number Of Curettages: 3
Size Of Lesion In Cm: 1
Size Of Lesion After Curettage: 1.4
Add Intralesional Injection: No
Anesthesia Type: 1% lidocaine with epinephrine
Cautery Type: electrodesiccation
What Was Performed First?: Curettage
Final Size Statement: The size of the lesion after curettage was
Additional Information: (Optional): The wound was cleaned, and a pressure dressing was applied.  The patient received detailed post-op instructions.
Consent was obtained from the patient. The risks, benefits and alternatives to therapy were discussed in detail. Specifically, the risks of infection, scarring, bleeding, prolonged wound healing, nerve injury, incomplete removal, allergy to anesthesia and recurrence were addressed. Alternatives to ED&C, such as: surgical removal and XRT were also discussed.  Prior to the procedure, the treatment site was clearly identified and confirmed by the patient. All components of Universal Protocol/PAUSE Rule completed.
Post-Care Instructions: I reviewed with the patient in detail post-care instructions. Patient is to keep the area dry for 48 hours, and not to engage in any swimming until the area is healed. Should the patient develop any fevers, chills, bleeding, severe pain patient will contact the office immediately.
Bill As A Line Item Or As Units: Line Item
English

## 2024-10-07 ENCOUNTER — APPOINTMENT (RX ONLY)
Dept: URBAN - METROPOLITAN AREA CLINIC 334 | Facility: CLINIC | Age: 54
Setting detail: DERMATOLOGY
End: 2024-10-07

## 2024-10-07 DIAGNOSIS — L57.0 ACTINIC KERATOSIS: ICD-10-CM | Status: INADEQUATELY CONTROLLED

## 2024-10-07 PROCEDURE — 17003 DESTRUCT PREMALG LES 2-14: CPT

## 2024-10-07 PROCEDURE — ? LIQUID NITROGEN

## 2024-10-07 PROCEDURE — 17000 DESTRUCT PREMALG LESION: CPT

## 2024-10-07 PROCEDURE — ? COUNSELING

## 2024-10-07 PROCEDURE — 99212 OFFICE O/P EST SF 10 MIN: CPT | Mod: 25

## 2024-10-07 PROCEDURE — ? PATHOLOGY DISCUSSION

## 2024-10-07 ASSESSMENT — LOCATION ZONE DERM
LOCATION ZONE: ARM
LOCATION ZONE: NOSE

## 2024-10-07 ASSESSMENT — LOCATION DETAILED DESCRIPTION DERM
LOCATION DETAILED: RIGHT POSTERIOR SHOULDER
LOCATION DETAILED: RIGHT LATERAL SHOULDER
LOCATION DETAILED: NASAL ROOT

## 2024-10-07 ASSESSMENT — LOCATION SIMPLE DESCRIPTION DERM
LOCATION SIMPLE: RIGHT SHOULDER
LOCATION SIMPLE: NOSE

## 2024-10-07 NOTE — PROCEDURE: LIQUID NITROGEN
Consent: The patient's consent was obtained including but not limited to risks of crusting, scabbing, blistering, scarring, darker or lighter pigmentary change, recurrence, incomplete removal and infection.
Render Post-Care Instructions In Note?: no
Post-Care Instructions: I reviewed with the patient in detail post-care instructions. Patient is to wear sunprotection, and avoid picking at any of the treated lesions. Pt may apply Vaseline to crusted or scabbing areas.
Show Aperture Variable?: Yes
Number Of Freeze-Thaw Cycles: 1 freeze-thaw cycle
Detail Level: Detailed
Application Tool (Optional): Liquid Nitrogen Sprayer
Duration Of Freeze Thaw-Cycle (Seconds): 0

## 2024-10-17 ENCOUNTER — COMPREHENSIVE EXAM (OUTPATIENT)
Dept: URBAN - METROPOLITAN AREA CLINIC 29 | Facility: CLINIC | Age: 54
End: 2024-10-17

## 2024-10-17 DIAGNOSIS — H52.13: ICD-10-CM

## 2024-10-17 DIAGNOSIS — H52.222: ICD-10-CM

## 2024-10-17 DIAGNOSIS — H52.4: ICD-10-CM

## 2024-10-17 PROCEDURE — 92015 DETERMINE REFRACTIVE STATE: CPT

## 2024-10-17 PROCEDURE — 92014 COMPRE OPH EXAM EST PT 1/>: CPT

## 2025-03-05 ENCOUNTER — APPOINTMENT (OUTPATIENT)
Dept: URBAN - METROPOLITAN AREA CLINIC 334 | Facility: CLINIC | Age: 55
Setting detail: DERMATOLOGY
End: 2025-03-05

## 2025-03-05 DIAGNOSIS — L82.1 OTHER SEBORRHEIC KERATOSIS: ICD-10-CM

## 2025-03-05 DIAGNOSIS — B35.1 TINEA UNGUIUM: ICD-10-CM | Status: INADEQUATELY CONTROLLED

## 2025-03-05 DIAGNOSIS — Z12.83 ENCOUNTER FOR SCREENING FOR MALIGNANT NEOPLASM OF SKIN: ICD-10-CM

## 2025-03-05 DIAGNOSIS — L81.4 OTHER MELANIN HYPERPIGMENTATION: ICD-10-CM

## 2025-03-05 DIAGNOSIS — Z85.828 PERSONAL HISTORY OF OTHER MALIGNANT NEOPLASM OF SKIN: ICD-10-CM

## 2025-03-05 DIAGNOSIS — D18.0 HEMANGIOMA: ICD-10-CM

## 2025-03-05 PROBLEM — D18.01 HEMANGIOMA OF SKIN AND SUBCUTANEOUS TISSUE: Status: ACTIVE | Noted: 2025-03-05

## 2025-03-05 PROBLEM — L08.9 LOCAL INFECTION OF THE SKIN AND SUBCUTANEOUS TISSUE, UNSPECIFIED: Status: ACTIVE | Noted: 2025-03-05

## 2025-03-05 PROCEDURE — 99213 OFFICE O/P EST LOW 20 MIN: CPT

## 2025-03-05 PROCEDURE — ? COUNSELING

## 2025-03-05 PROCEDURE — ? TREATMENT REGIMEN

## 2025-03-05 PROCEDURE — ? NAIL CLIPPING FOR PATHOLOGY

## 2025-03-05 PROCEDURE — ? FULL BODY SKIN EXAM

## 2025-03-05 ASSESSMENT — LOCATION DETAILED DESCRIPTION DERM
LOCATION DETAILED: PERIUMBILICAL SKIN
LOCATION DETAILED: UPPER STERNUM
LOCATION DETAILED: RIGHT INFERIOR MEDIAL UPPER BACK
LOCATION DETAILED: LEFT GREAT TOENAIL

## 2025-03-05 ASSESSMENT — LOCATION ZONE DERM
LOCATION ZONE: TOENAIL
LOCATION ZONE: TRUNK

## 2025-03-05 ASSESSMENT — LOCATION SIMPLE DESCRIPTION DERM
LOCATION SIMPLE: ABDOMEN
LOCATION SIMPLE: CHEST
LOCATION SIMPLE: LEFT GREAT TOE
LOCATION SIMPLE: RIGHT UPPER BACK

## 2025-03-25 ENCOUNTER — RX ONLY (RX ONLY)
Age: 55
End: 2025-03-25

## 2025-03-25 RX ORDER — UREA 40 G/100G
CREAM TOPICAL
Qty: 28.35 | Refills: 1 | Status: ERX | COMMUNITY
Start: 2025-03-25